# Patient Record
Sex: MALE | Race: WHITE | Employment: FULL TIME
[De-identification: names, ages, dates, MRNs, and addresses within clinical notes are randomized per-mention and may not be internally consistent; named-entity substitution may affect disease eponyms.]

---

## 2022-03-19 PROBLEM — N40.0 BENIGN PROSTATIC HYPERPLASIA WITHOUT LOWER URINARY TRACT SYMPTOMS: Status: ACTIVE | Noted: 2021-06-30

## 2022-03-19 PROBLEM — Z00.00 MEDICARE ANNUAL WELLNESS VISIT, SUBSEQUENT: Status: ACTIVE | Noted: 2021-08-11

## 2022-03-19 PROBLEM — Z71.89 ADVANCED CARE PLANNING/COUNSELING DISCUSSION: Status: ACTIVE | Noted: 2021-08-11

## 2022-06-16 ENCOUNTER — OFFICE VISIT (OUTPATIENT)
Dept: FAMILY MEDICINE CLINIC | Facility: CLINIC | Age: 52
End: 2022-06-16
Payer: MEDICARE

## 2022-06-16 VITALS
WEIGHT: 204 LBS | OXYGEN SATURATION: 95 % | BODY MASS INDEX: 31.02 KG/M2 | SYSTOLIC BLOOD PRESSURE: 102 MMHG | DIASTOLIC BLOOD PRESSURE: 70 MMHG | HEART RATE: 94 BPM

## 2022-06-16 DIAGNOSIS — I10 PRIMARY HYPERTENSION: Primary | ICD-10-CM

## 2022-06-16 DIAGNOSIS — I10 PRIMARY HYPERTENSION: ICD-10-CM

## 2022-06-16 DIAGNOSIS — Z12.12 SCREENING FOR MALIGNANT NEOPLASM OF THE RECTUM: ICD-10-CM

## 2022-06-16 DIAGNOSIS — Z12.11 SPECIAL SCREENING FOR MALIGNANT NEOPLASMS, COLON: ICD-10-CM

## 2022-06-16 DIAGNOSIS — N40.0 BENIGN PROSTATIC HYPERPLASIA WITHOUT LOWER URINARY TRACT SYMPTOMS: ICD-10-CM

## 2022-06-16 DIAGNOSIS — E78.00 HYPERCHOLESTEREMIA: ICD-10-CM

## 2022-06-16 PROBLEM — Z00.00 MEDICARE ANNUAL WELLNESS VISIT, SUBSEQUENT: Chronic | Status: ACTIVE | Noted: 2021-08-11

## 2022-06-16 PROBLEM — Z71.89 ADVANCED CARE PLANNING/COUNSELING DISCUSSION: Chronic | Status: ACTIVE | Noted: 2021-08-11

## 2022-06-16 LAB
ANION GAP SERPL CALC-SCNC: 7 MMOL/L (ref 7–16)
BUN SERPL-MCNC: 14 MG/DL (ref 6–23)
CALCIUM SERPL-MCNC: 9.1 MG/DL (ref 8.3–10.4)
CHLORIDE SERPL-SCNC: 100 MMOL/L (ref 98–107)
CO2 SERPL-SCNC: 32 MMOL/L (ref 21–32)
CREAT SERPL-MCNC: 1.2 MG/DL (ref 0.8–1.5)
GLUCOSE SERPL-MCNC: 93 MG/DL (ref 65–100)
POTASSIUM SERPL-SCNC: 3.6 MMOL/L (ref 3.5–5.1)
PSA SERPL-MCNC: 0.9 NG/ML
SODIUM SERPL-SCNC: 139 MMOL/L (ref 138–145)

## 2022-06-16 PROCEDURE — 99214 OFFICE O/P EST MOD 30 MIN: CPT | Performed by: FAMILY MEDICINE

## 2022-06-16 PROCEDURE — 3017F COLORECTAL CA SCREEN DOC REV: CPT | Performed by: FAMILY MEDICINE

## 2022-06-16 PROCEDURE — 4004F PT TOBACCO SCREEN RCVD TLK: CPT | Performed by: FAMILY MEDICINE

## 2022-06-16 PROCEDURE — G8417 CALC BMI ABV UP PARAM F/U: HCPCS | Performed by: FAMILY MEDICINE

## 2022-06-16 PROCEDURE — G8427 DOCREV CUR MEDS BY ELIG CLIN: HCPCS | Performed by: FAMILY MEDICINE

## 2022-06-16 RX ORDER — INDAPAMIDE 1.25 MG/1
1.25 TABLET, FILM COATED ORAL DAILY
Qty: 90 TABLET | Refills: 1 | Status: SHIPPED | OUTPATIENT
Start: 2022-06-16

## 2022-06-16 RX ORDER — ATORVASTATIN CALCIUM 80 MG/1
80 TABLET, FILM COATED ORAL DAILY
Qty: 90 TABLET | Refills: 1 | Status: SHIPPED | OUTPATIENT
Start: 2022-06-16

## 2022-06-16 RX ORDER — ASPIRIN 81 MG/1
81 TABLET ORAL DAILY
COMMUNITY

## 2022-06-16 RX ORDER — RAMIPRIL 10 MG/1
10 CAPSULE ORAL DAILY
Qty: 90 CAPSULE | Refills: 1 | Status: SHIPPED | OUTPATIENT
Start: 2022-06-16

## 2022-06-16 ASSESSMENT — PATIENT HEALTH QUESTIONNAIRE - PHQ9
SUM OF ALL RESPONSES TO PHQ9 QUESTIONS 1 & 2: 0
SUM OF ALL RESPONSES TO PHQ QUESTIONS 1-9: 0
2. FEELING DOWN, DEPRESSED OR HOPELESS: 0
SUM OF ALL RESPONSES TO PHQ QUESTIONS 1-9: 0
1. LITTLE INTEREST OR PLEASURE IN DOING THINGS: 0

## 2022-06-16 NOTE — PROGRESS NOTES
Subjective:  Celeste Sibley is a 46 y.o. male presents today for their semi-annual htn visit. They are having no side effects and are doing well. Systems review of cardiovascular and pulmonary systems reveal no complaints or pertinent positives. Patient denies any pounding heart beats or rapid heart beat intervals, flushing, panic like attacks, headaches, dizzyness or flushing. They have drug reistant hypertension, on 3 or more different medicaitons including one diuretic- No    How much are you exercising? Three days a week  Do you smoke? No  Are you taking your medicines as directed most every day? Yes  Do you follow a low sodium DASH diet or similar high blood pressure diet? No  Do you check your bp at home with an automated blood pressure device? Yes  If you don't have a home bp machine, you should purchase one at home and begin to check your pressure at home on a regular basis. Your blood pressure goal is listed under the \"plan section\" below    No Known Allergies   reports that he has never smoked. He uses smokeless tobacco.  Current Outpatient Medications   Medication Sig Dispense Refill    aspirin 81 MG EC tablet Take 81 mg by mouth daily      atorvastatin (LIPITOR) 80 MG tablet Take 1 tablet by mouth daily 90 tablet 1    indapamide (LOZOL) 1.25 MG tablet Take 1 tablet by mouth daily 90 tablet 1    ramipril (ALTACE) 10 MG capsule Take 1 capsule by mouth daily 90 capsule 1    baclofen (LIORESAL) 20 MG tablet Take 20 mg by mouth 3 times daily      DULoxetine (CYMBALTA) 60 MG extended release capsule Take 60 mg by mouth 2 times daily      Phenylephrine HCl 10 MG TABS Take 1 tablet by mouth      tapentadol (NUCYNTA) 50 MG TABS Take 50 mg by mouth 4 times daily. No current facility-administered medications for this visit.        Lab Results   Component Value Date     12/23/2021    K 3.5 12/23/2021    CL 99 12/23/2021    CO2 26 12/23/2021    BUN 14 12/23/2021    CREATININE 1.15 12/23/2021    GLUCOSE 96 12/23/2021    CALCIUM 9.3 12/23/2021          Objective:  Blood pressure 102/70, pulse 94, weight 204 lb (92.5 kg), SpO2 95 %. Body mass index is 31.02 kg/m². BP Readings from Last 3 Encounters:   06/16/22 102/70   12/23/21 120/84   08/11/21 118/68     General- Pleasant and no distress  Psych- alert and oriented to person, place and time  Mood and affect are appropriate to situation  Musculoskeletal - Gait and station examination reveals mid-position with no abnormalities. Neurological- grossly intact. rrr s mrg.   bcta  extremeties are without edema, and dp, and pt pulses are intact  No carotid bruits   Fundoscopic exam is: benign without retinopathology   Prostate of increased size and without asymmetry, nodules, or masses. He has good rectal tone with light brown stool. Some of today's visit is spent counseling with review of symptoms, disposition, prognosis and treatment plan options in addition to limited behavioral counseling and recommendations regarding an enlarged prostate and possible symptoms of low testosterone    AUA symptom index if administered is on flow sheet  Return in one year for annual prostate exam. We will call you back if labs are abnormal.      Assessment:  1. Primary hypertension    2. Benign prostatic hyperplasia without lower urinary tract symptoms    3. Hypercholesteremia    4. Special screening for malignant neoplasms, colon    5. Screening for malignant neoplasm of the rectum      Plan:   Prescription drug management occurs today as follows:  Because current regimen for blood pressure is now working and tolerated, will continue medications as listed    Francisco has been given the following recommendations today due to his elevated BP reading: lifestyle modifications to include: DASH eating plan and lab tests ordered. Personal instruction is given.   For your information, consider the following:  [facility] is an excellent site that will give you a list of approved blood pressure devices  [employer] is an excellent site that will teach you how to take accurate bp measurements at home. Significant weight loss can result in a drop of 5-10mm blood pressure. A DASH diet (see bookstore or go to www."[employer] and print DASH diet) will lower 8-14mm blood pressure. Salt restriction will lower your bp 2-8mm. Lowering alcohol consumption to moderate use will lower your pressure 2-4mm. Continue efforts to maintain an exercise regimen. Normal blood pressure target is now 120/80. Stage 1 or \"pre-hypertension\" or \"high normal hypertension\" is 130-139/80-89. We sometimes begin treatment with medications. Stage 2 is >140/90 which is when we always begin treatment with medications. For high risk patients such as those with heart disease, a history of stents, angioplasty, heart attacks, strokes, diabetes and chronic kidney disease,your blood pressure goal is 130/80. For lower risk patients without the high risk categories, your goal for blood pressure is 139/89. Unless you are older than 72years old we may try for a systolic bp of 731 or we will accept higher pressures if the lower pressures are not tolerated. 1. Primary hypertension  -     indapamide (LOZOL) 1.25 MG tablet; Take 1 tablet by mouth daily, Disp-90 tablet, R-1Normal  -     ramipril (ALTACE) 10 MG capsule; Take 1 capsule by mouth daily, Disp-90 capsule, R-1Normal  -     Basic Metabolic Panel; Future  2. Benign prostatic hyperplasia without lower urinary tract symptoms  -     PSA, Diagnostic; Future  3. Hypercholesteremia  -     atorvastatin (LIPITOR) 80 MG tablet; Take 1 tablet by mouth daily, Disp-90 tablet, R-1Normal  4. Special screening for malignant neoplasms, colon  -     External Referral To Gastroenterology  5. Screening for malignant neoplasm of the rectum  -     External Referral To Gastroenterology      Followup:  Return for 6 mo for htn, lipids. Late August for mwv.

## 2022-07-16 PROBLEM — Z00.00 MEDICARE ANNUAL WELLNESS VISIT, SUBSEQUENT: Chronic | Status: RESOLVED | Noted: 2021-08-11 | Resolved: 2022-07-16

## 2022-12-14 ENCOUNTER — OFFICE VISIT (OUTPATIENT)
Dept: FAMILY MEDICINE CLINIC | Facility: CLINIC | Age: 52
End: 2022-12-14
Payer: MEDICARE

## 2022-12-14 VITALS
BODY MASS INDEX: 31.95 KG/M2 | WEIGHT: 210.8 LBS | HEART RATE: 81 BPM | DIASTOLIC BLOOD PRESSURE: 78 MMHG | SYSTOLIC BLOOD PRESSURE: 128 MMHG | OXYGEN SATURATION: 98 % | HEIGHT: 68 IN

## 2022-12-14 DIAGNOSIS — I10 PRIMARY HYPERTENSION: Primary | ICD-10-CM

## 2022-12-14 DIAGNOSIS — I63.9 ACUTE ISCHEMIC STROKE (HCC): ICD-10-CM

## 2022-12-14 DIAGNOSIS — E78.00 HYPERCHOLESTEREMIA: ICD-10-CM

## 2022-12-14 PROCEDURE — 3078F DIAST BP <80 MM HG: CPT | Performed by: FAMILY MEDICINE

## 2022-12-14 PROCEDURE — G8427 DOCREV CUR MEDS BY ELIG CLIN: HCPCS | Performed by: FAMILY MEDICINE

## 2022-12-14 PROCEDURE — 3074F SYST BP LT 130 MM HG: CPT | Performed by: FAMILY MEDICINE

## 2022-12-14 PROCEDURE — G8417 CALC BMI ABV UP PARAM F/U: HCPCS | Performed by: FAMILY MEDICINE

## 2022-12-14 PROCEDURE — 4004F PT TOBACCO SCREEN RCVD TLK: CPT | Performed by: FAMILY MEDICINE

## 2022-12-14 PROCEDURE — 99214 OFFICE O/P EST MOD 30 MIN: CPT | Performed by: FAMILY MEDICINE

## 2022-12-14 PROCEDURE — G8484 FLU IMMUNIZE NO ADMIN: HCPCS | Performed by: FAMILY MEDICINE

## 2022-12-14 PROCEDURE — 3017F COLORECTAL CA SCREEN DOC REV: CPT | Performed by: FAMILY MEDICINE

## 2022-12-14 RX ORDER — INDAPAMIDE 1.25 MG/1
1.25 TABLET, FILM COATED ORAL DAILY
Qty: 90 TABLET | Refills: 1 | Status: SHIPPED | OUTPATIENT
Start: 2022-12-14

## 2022-12-14 RX ORDER — RAMIPRIL 10 MG/1
10 CAPSULE ORAL DAILY
Qty: 90 CAPSULE | Refills: 1 | Status: SHIPPED | OUTPATIENT
Start: 2022-12-14

## 2022-12-14 RX ORDER — ATORVASTATIN CALCIUM 80 MG/1
80 TABLET, FILM COATED ORAL DAILY
Qty: 90 TABLET | Refills: 1 | Status: SHIPPED | OUTPATIENT
Start: 2022-12-14

## 2022-12-14 ASSESSMENT — PATIENT HEALTH QUESTIONNAIRE - PHQ9
SUM OF ALL RESPONSES TO PHQ QUESTIONS 1-9: 0
SUM OF ALL RESPONSES TO PHQ QUESTIONS 1-9: 0
SUM OF ALL RESPONSES TO PHQ9 QUESTIONS 1 & 2: 0
2. FEELING DOWN, DEPRESSED OR HOPELESS: 0
SUM OF ALL RESPONSES TO PHQ QUESTIONS 1-9: 0
SUM OF ALL RESPONSES TO PHQ QUESTIONS 1-9: 0
1. LITTLE INTEREST OR PLEASURE IN DOING THINGS: 0

## 2022-12-14 NOTE — PROGRESS NOTES
Subjective:  Alirio Toro is a 45 y.o. male presents today for their semi-annual htn visit. They are having no side effects and are doing well. Systems review of cardiovascular and pulmonary systems reveal no complaints or pertinent postivies. They also have a diagnosis of dyslipidemia and are due for lipids, denying any current side effects, continuing diet and exercise the best they can. Patient denies any pounding heart beats or rapid heart beat intervals, flushing, panic like attacks, headaches, dizzyness or flushing. They have drug reistant hypertension, on 3 or more different medicaitons including one diuretic- No    How much are you exercising? 3 days a week. Do you smoke? No  Are you taking your medicines as directed most every day? Yes  Do you follow a low sodium DASH diet or similar high blood pressure diet? No  Do you check your bp at home with an automated blood pressure device? Yes  If you don't have a home bp machine, you should purchase one at home and begin to check your pressure at home on a regular basis. Your blood pressure goal is listed under the \"plan section\" below  PHQ-9 Total Score: 0 (12/14/2022  9:35 AM)    No Known Allergies   reports that he has never smoked. He uses smokeless tobacco.    Current Outpatient Medications   Medication Sig Dispense Refill    atorvastatin (LIPITOR) 80 MG tablet Take 1 tablet by mouth daily 90 tablet 1    indapamide (LOZOL) 1.25 MG tablet Take 1 tablet by mouth daily 90 tablet 1    ramipril (ALTACE) 10 MG capsule Take 1 capsule by mouth daily 90 capsule 1    aspirin 81 MG EC tablet Take 81 mg by mouth daily      baclofen (LIORESAL) 20 MG tablet Take 20 mg by mouth 3 times daily      DULoxetine (CYMBALTA) 60 MG extended release capsule Take 60 mg by mouth 2 times daily      Phenylephrine HCl 10 MG TABS Take 1 tablet by mouth      tapentadol (NUCYNTA) 50 MG TABS Take 50 mg by mouth 4 times daily.        No current facility-administered medications for this visit. Lab Results   Component Value Date/Time     06/16/2022 11:44 AM    K 3.6 06/16/2022 11:44 AM     06/16/2022 11:44 AM    CO2 32 06/16/2022 11:44 AM    BUN 14 06/16/2022 11:44 AM    CREATININE 1.20 06/16/2022 11:44 AM    GLUCOSE 93 06/16/2022 11:44 AM    CALCIUM 9.1 06/16/2022 11:44 AM        Objective:  Blood pressure 128/78, pulse 81, height 5' 8\" (1.727 m), weight 210 lb 12.8 oz (95.6 kg), SpO2 98 %. Body mass index is 32.05 kg/m². BP Readings from Last 3 Encounters:   12/14/22 128/78   06/16/22 102/70   12/23/21 120/84     General- Pleasant and no distress  Psych- alert and oriented to person, place and time  Mood and affect are appropriate to situation  Musculoskeletal - Gait and station examination reveals mid-position with no abnormalities. Neurological- grossly intact. rrr s mrg.   bcta  extremeties are without edema, and dp, and pt pulses are intact  No carotid bruits   Fundoscopic exam is: benign without retinopathology     Assessment:  1. Primary hypertension    2. Hypercholesteremia    3. Acute ischemic stroke [facility])        Plan:   Prescription drug management occurs today as follows:  Because current regimen for blood pressure is now working and tolerated, will continue medications as listed    Francisco has been given the following recommendations today due to his elevated BP reading: lab tests ordered. Personal instruction is given. For your information, consider the following:  [employer] is an excellent site that will give you a list of approved blood pressure devices  Jennifer Mccarty is an excellent site that will teach you how to take accurate bp measurements at home. Significant weight loss can result in a drop of 5-10mm blood pressure. A DASH diet (see bookstore or go to www.[email or web address] and print DASH diet) will lower 8-14mm blood pressure. Salt restriction will lower your bp 2-8mm.   Lowering alcohol consumption to moderate use will lower your pressure 2-4mm. Continue efforts to maintain an exercise regimen. Normal blood pressure target is now 120/80. Stage 1 or \"pre-hypertension\" or \"high normal hypertension\" is 130-139/80-89. We sometimes begin treatment with medications. Stage 2 is >140/90 which is when we always begin treatment with medications. For high risk patients such as those with heart disease, a history of stents, angioplasty, heart attacks, strokes, diabetes and chronic kidney disease,your blood pressure goal is 130/80. For lower risk patients without the high risk categories, your goal for blood pressure is 139/89. Unless you are older than 72years old we may try for a systolic bp of 238 or we will accept higher pressures if the lower pressures are not tolerated. 1. Primary hypertension  -     Basic Metabolic Panel; Future  -     indapamide (LOZOL) 1.25 MG tablet; Take 1 tablet by mouth daily, Disp-90 tablet, R-1Normal  -     ramipril (ALTACE) 10 MG capsule; Take 1 capsule by mouth daily, Disp-90 capsule, R-1Normal  2. Hypercholesteremia  -     Lipid Panel; Future  -     atorvastatin (LIPITOR) 80 MG tablet; Take 1 tablet by mouth daily, Disp-90 tablet, R-1Normal  3. Acute ischemic stroke [facility])    Followup:  Return for 6 mo for htn and prostate visit,  combine with david.

## 2022-12-15 LAB
ANION GAP SERPL CALC-SCNC: 6 MMOL/L (ref 2–11)
BUN SERPL-MCNC: 12 MG/DL (ref 6–23)
CALCIUM SERPL-MCNC: 9.4 MG/DL (ref 8.3–10.4)
CHLORIDE SERPL-SCNC: 102 MMOL/L (ref 101–110)
CHOLEST SERPL-MCNC: 114 MG/DL
CO2 SERPL-SCNC: 32 MMOL/L (ref 21–32)
CREAT SERPL-MCNC: 1.2 MG/DL (ref 0.8–1.5)
GLUCOSE SERPL-MCNC: 96 MG/DL (ref 65–100)
HDLC SERPL-MCNC: 38 MG/DL (ref 40–60)
HDLC SERPL: 3 {RATIO}
LDLC SERPL CALC-MCNC: 49.2 MG/DL
POTASSIUM SERPL-SCNC: 3.3 MMOL/L (ref 3.5–5.1)
SODIUM SERPL-SCNC: 140 MMOL/L (ref 133–143)
TRIGL SERPL-MCNC: 134 MG/DL (ref 35–150)
VLDLC SERPL CALC-MCNC: 26.8 MG/DL (ref 6–23)

## 2022-12-16 RX ORDER — POTASSIUM CHLORIDE 750 MG/1
10 TABLET, EXTENDED RELEASE ORAL 2 TIMES DAILY
Qty: 180 TABLET | Refills: 1 | OUTPATIENT
Start: 2022-12-16

## 2023-01-13 PROBLEM — Z00.00 MEDICARE ANNUAL WELLNESS VISIT, SUBSEQUENT: Chronic | Status: RESOLVED | Noted: 2021-08-11 | Resolved: 2023-01-13

## 2023-06-26 ENCOUNTER — OFFICE VISIT (OUTPATIENT)
Dept: FAMILY MEDICINE CLINIC | Facility: CLINIC | Age: 53
End: 2023-06-26
Payer: MEDICARE

## 2023-06-26 VITALS
SYSTOLIC BLOOD PRESSURE: 109 MMHG | BODY MASS INDEX: 31.52 KG/M2 | WEIGHT: 208 LBS | DIASTOLIC BLOOD PRESSURE: 74 MMHG | HEIGHT: 68 IN | HEART RATE: 80 BPM | OXYGEN SATURATION: 97 %

## 2023-06-26 DIAGNOSIS — E78.00 HYPERCHOLESTEREMIA: ICD-10-CM

## 2023-06-26 DIAGNOSIS — N40.0 BENIGN PROSTATIC HYPERPLASIA WITHOUT LOWER URINARY TRACT SYMPTOMS: ICD-10-CM

## 2023-06-26 DIAGNOSIS — I10 PRIMARY HYPERTENSION: Primary | ICD-10-CM

## 2023-06-26 DIAGNOSIS — Z00.00 MEDICARE ANNUAL WELLNESS VISIT, SUBSEQUENT: Chronic | ICD-10-CM

## 2023-06-26 DIAGNOSIS — D12.6 TUBULAR ADENOMA OF COLON: ICD-10-CM

## 2023-06-26 PROCEDURE — 3078F DIAST BP <80 MM HG: CPT | Performed by: FAMILY MEDICINE

## 2023-06-26 PROCEDURE — 4004F PT TOBACCO SCREEN RCVD TLK: CPT | Performed by: FAMILY MEDICINE

## 2023-06-26 PROCEDURE — G0439 PPPS, SUBSEQ VISIT: HCPCS | Performed by: FAMILY MEDICINE

## 2023-06-26 PROCEDURE — 3017F COLORECTAL CA SCREEN DOC REV: CPT | Performed by: FAMILY MEDICINE

## 2023-06-26 PROCEDURE — G8427 DOCREV CUR MEDS BY ELIG CLIN: HCPCS | Performed by: FAMILY MEDICINE

## 2023-06-26 PROCEDURE — 3074F SYST BP LT 130 MM HG: CPT | Performed by: FAMILY MEDICINE

## 2023-06-26 PROCEDURE — G8417 CALC BMI ABV UP PARAM F/U: HCPCS | Performed by: FAMILY MEDICINE

## 2023-06-26 PROCEDURE — 99214 OFFICE O/P EST MOD 30 MIN: CPT | Performed by: FAMILY MEDICINE

## 2023-06-26 RX ORDER — POTASSIUM CHLORIDE 750 MG/1
10 TABLET, EXTENDED RELEASE ORAL 2 TIMES DAILY
Qty: 180 TABLET | Refills: 1 | Status: SHIPPED | OUTPATIENT
Start: 2023-06-26

## 2023-06-26 RX ORDER — RAMIPRIL 10 MG/1
10 CAPSULE ORAL DAILY
Qty: 90 CAPSULE | Refills: 1 | Status: SHIPPED | OUTPATIENT
Start: 2023-06-26

## 2023-06-26 RX ORDER — INDAPAMIDE 1.25 MG/1
1.25 TABLET, FILM COATED ORAL DAILY
Qty: 90 TABLET | Refills: 1 | Status: SHIPPED | OUTPATIENT
Start: 2023-06-26

## 2023-06-26 RX ORDER — ATORVASTATIN CALCIUM 80 MG/1
80 TABLET, FILM COATED ORAL DAILY
Qty: 90 TABLET | Refills: 1 | Status: SHIPPED | OUTPATIENT
Start: 2023-06-26

## 2023-06-26 SDOH — ECONOMIC STABILITY: INCOME INSECURITY: HOW HARD IS IT FOR YOU TO PAY FOR THE VERY BASICS LIKE FOOD, HOUSING, MEDICAL CARE, AND HEATING?: NOT HARD AT ALL

## 2023-06-26 SDOH — ECONOMIC STABILITY: FOOD INSECURITY: WITHIN THE PAST 12 MONTHS, YOU WORRIED THAT YOUR FOOD WOULD RUN OUT BEFORE YOU GOT MONEY TO BUY MORE.: NEVER TRUE

## 2023-06-26 SDOH — ECONOMIC STABILITY: FOOD INSECURITY: WITHIN THE PAST 12 MONTHS, THE FOOD YOU BOUGHT JUST DIDN'T LAST AND YOU DIDN'T HAVE MONEY TO GET MORE.: NEVER TRUE

## 2023-06-26 SDOH — ECONOMIC STABILITY: HOUSING INSECURITY
IN THE LAST 12 MONTHS, WAS THERE A TIME WHEN YOU DID NOT HAVE A STEADY PLACE TO SLEEP OR SLEPT IN A SHELTER (INCLUDING NOW)?: NO

## 2023-06-26 ASSESSMENT — LIFESTYLE VARIABLES
HOW MANY STANDARD DRINKS CONTAINING ALCOHOL DO YOU HAVE ON A TYPICAL DAY: PATIENT DOES NOT DRINK
HOW OFTEN DO YOU HAVE A DRINK CONTAINING ALCOHOL: NEVER

## 2023-06-26 ASSESSMENT — PATIENT HEALTH QUESTIONNAIRE - PHQ9
2. FEELING DOWN, DEPRESSED OR HOPELESS: 0
1. LITTLE INTEREST OR PLEASURE IN DOING THINGS: 0
SUM OF ALL RESPONSES TO PHQ QUESTIONS 1-9: 0
SUM OF ALL RESPONSES TO PHQ9 QUESTIONS 1 & 2: 0
SUM OF ALL RESPONSES TO PHQ QUESTIONS 1-9: 0

## 2023-06-27 LAB
ANION GAP SERPL CALC-SCNC: 1 MMOL/L (ref 2–11)
BUN SERPL-MCNC: 14 MG/DL (ref 6–23)
CALCIUM SERPL-MCNC: 9.5 MG/DL (ref 8.3–10.4)
CHLORIDE SERPL-SCNC: 104 MMOL/L (ref 101–110)
CO2 SERPL-SCNC: 32 MMOL/L (ref 21–32)
CREAT SERPL-MCNC: 1.2 MG/DL (ref 0.8–1.5)
GLUCOSE SERPL-MCNC: 98 MG/DL (ref 65–100)
POTASSIUM SERPL-SCNC: 4 MMOL/L (ref 3.5–5.1)
PSA SERPL-MCNC: 0.9 NG/ML
SODIUM SERPL-SCNC: 137 MMOL/L (ref 133–143)

## 2023-09-12 ENCOUNTER — OFFICE VISIT (OUTPATIENT)
Dept: FAMILY MEDICINE CLINIC | Facility: CLINIC | Age: 53
End: 2023-09-12
Payer: MEDICARE

## 2023-09-12 VITALS
SYSTOLIC BLOOD PRESSURE: 110 MMHG | OXYGEN SATURATION: 96 % | RESPIRATION RATE: 18 BRPM | TEMPERATURE: 97 F | HEART RATE: 62 BPM | DIASTOLIC BLOOD PRESSURE: 72 MMHG | BODY MASS INDEX: 31.31 KG/M2 | WEIGHT: 206.6 LBS | HEIGHT: 68 IN

## 2023-09-12 DIAGNOSIS — H66.001 NON-RECURRENT ACUTE SUPPURATIVE OTITIS MEDIA OF RIGHT EAR WITHOUT SPONTANEOUS RUPTURE OF TYMPANIC MEMBRANE: Primary | ICD-10-CM

## 2023-09-12 DIAGNOSIS — B34.9 VIRAL SYNDROME: ICD-10-CM

## 2023-09-12 DIAGNOSIS — M79.10 MYALGIA: ICD-10-CM

## 2023-09-12 DIAGNOSIS — R05.1 ACUTE COUGH: ICD-10-CM

## 2023-09-12 PROCEDURE — 3017F COLORECTAL CA SCREEN DOC REV: CPT | Performed by: FAMILY MEDICINE

## 2023-09-12 PROCEDURE — 3074F SYST BP LT 130 MM HG: CPT | Performed by: FAMILY MEDICINE

## 2023-09-12 PROCEDURE — 3078F DIAST BP <80 MM HG: CPT | Performed by: FAMILY MEDICINE

## 2023-09-12 PROCEDURE — 4004F PT TOBACCO SCREEN RCVD TLK: CPT | Performed by: FAMILY MEDICINE

## 2023-09-12 PROCEDURE — G8427 DOCREV CUR MEDS BY ELIG CLIN: HCPCS | Performed by: FAMILY MEDICINE

## 2023-09-12 PROCEDURE — G8417 CALC BMI ABV UP PARAM F/U: HCPCS | Performed by: FAMILY MEDICINE

## 2023-09-12 PROCEDURE — 99213 OFFICE O/P EST LOW 20 MIN: CPT | Performed by: FAMILY MEDICINE

## 2023-09-12 RX ORDER — BENZONATATE 200 MG/1
200 CAPSULE ORAL 3 TIMES DAILY PRN
Qty: 30 CAPSULE | Refills: 1 | Status: SHIPPED | OUTPATIENT
Start: 2023-09-12 | End: 2023-09-19

## 2023-09-12 RX ORDER — AMOXICILLIN 875 MG/1
875 TABLET, COATED ORAL 2 TIMES DAILY
Qty: 14 TABLET | Refills: 0 | Status: SHIPPED | OUTPATIENT
Start: 2023-09-12 | End: 2023-09-19

## 2023-09-12 ASSESSMENT — PATIENT HEALTH QUESTIONNAIRE - PHQ9
SUM OF ALL RESPONSES TO PHQ QUESTIONS 1-9: 0
SUM OF ALL RESPONSES TO PHQ9 QUESTIONS 1 & 2: 0
2. FEELING DOWN, DEPRESSED OR HOPELESS: 0
1. LITTLE INTEREST OR PLEASURE IN DOING THINGS: 0
SUM OF ALL RESPONSES TO PHQ QUESTIONS 1-9: 0

## 2024-04-03 ENCOUNTER — TELEPHONE (OUTPATIENT)
Dept: FAMILY MEDICINE CLINIC | Facility: CLINIC | Age: 54
End: 2024-04-03

## 2024-04-03 NOTE — TELEPHONE ENCOUNTER
----- Message from Taran Coronado sent at 4/3/2024  3:16 PM EDT -----  Subject: Message to Provider    QUESTIONS  Information for Provider? Patient is wanting to know if the PCP will fill   out a Foster Care Physical paperwork for him. He is not due to come in   until June but needs the paper ASAP. 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   ---------------------------------------------------------------------------  --------------  CALL BACK INFO  [phone]; OK to leave message on voicemail  ---------------------------------------------------------------------------  --------------  SCRIPT ANSWERS  Relationship to Patient? Self

## 2024-04-04 NOTE — TELEPHONE ENCOUNTER
I called Mr. Singh and left a voicemail stating he can drop off the paperwork today at the office and the nurse will fill it out. We will call him if we need any information from him. I let him know we will work on it as soon as we can as time allows us.

## 2024-06-17 ENCOUNTER — OFFICE VISIT (OUTPATIENT)
Dept: FAMILY MEDICINE CLINIC | Facility: CLINIC | Age: 54
End: 2024-06-17
Payer: MEDICARE

## 2024-06-17 VITALS
HEART RATE: 79 BPM | TEMPERATURE: 97 F | DIASTOLIC BLOOD PRESSURE: 83 MMHG | HEIGHT: 68 IN | SYSTOLIC BLOOD PRESSURE: 128 MMHG | BODY MASS INDEX: 33.19 KG/M2 | WEIGHT: 219 LBS | RESPIRATION RATE: 16 BRPM | OXYGEN SATURATION: 97 %

## 2024-06-17 DIAGNOSIS — E78.00 HYPERCHOLESTEREMIA: ICD-10-CM

## 2024-06-17 DIAGNOSIS — I10 PRIMARY HYPERTENSION: ICD-10-CM

## 2024-06-17 DIAGNOSIS — N40.0 BENIGN PROSTATIC HYPERPLASIA WITHOUT LOWER URINARY TRACT SYMPTOMS: Primary | ICD-10-CM

## 2024-06-17 LAB
ANION GAP SERPL CALC-SCNC: 10 MMOL/L (ref 9–18)
BUN SERPL-MCNC: 17 MG/DL (ref 6–23)
CALCIUM SERPL-MCNC: 9.5 MG/DL (ref 8.8–10.2)
CHLORIDE SERPL-SCNC: 98 MMOL/L (ref 98–107)
CO2 SERPL-SCNC: 30 MMOL/L (ref 20–28)
CREAT SERPL-MCNC: 1.04 MG/DL (ref 0.8–1.3)
GLUCOSE SERPL-MCNC: 98 MG/DL (ref 70–99)
POTASSIUM SERPL-SCNC: 3.9 MMOL/L (ref 3.5–5.1)
PSA SERPL-MCNC: 0.7 NG/ML (ref 0–4)
SODIUM SERPL-SCNC: 138 MMOL/L (ref 136–145)

## 2024-06-17 PROCEDURE — 99214 OFFICE O/P EST MOD 30 MIN: CPT | Performed by: FAMILY MEDICINE

## 2024-06-17 PROCEDURE — 3079F DIAST BP 80-89 MM HG: CPT | Performed by: FAMILY MEDICINE

## 2024-06-17 PROCEDURE — 4004F PT TOBACCO SCREEN RCVD TLK: CPT | Performed by: FAMILY MEDICINE

## 2024-06-17 PROCEDURE — G8417 CALC BMI ABV UP PARAM F/U: HCPCS | Performed by: FAMILY MEDICINE

## 2024-06-17 PROCEDURE — G8427 DOCREV CUR MEDS BY ELIG CLIN: HCPCS | Performed by: FAMILY MEDICINE

## 2024-06-17 PROCEDURE — 3017F COLORECTAL CA SCREEN DOC REV: CPT | Performed by: FAMILY MEDICINE

## 2024-06-17 PROCEDURE — 3074F SYST BP LT 130 MM HG: CPT | Performed by: FAMILY MEDICINE

## 2024-06-17 RX ORDER — POTASSIUM CHLORIDE 750 MG/1
10 TABLET, EXTENDED RELEASE ORAL 2 TIMES DAILY
Qty: 180 TABLET | Refills: 1 | Status: SHIPPED | OUTPATIENT
Start: 2024-06-17

## 2024-06-17 RX ORDER — RAMIPRIL 10 MG/1
10 CAPSULE ORAL DAILY
Qty: 90 CAPSULE | Refills: 1 | Status: SHIPPED | OUTPATIENT
Start: 2024-06-17

## 2024-06-17 RX ORDER — INDAPAMIDE 1.25 MG/1
1.25 TABLET ORAL DAILY
Qty: 90 TABLET | Refills: 1 | Status: SHIPPED | OUTPATIENT
Start: 2024-06-17

## 2024-06-17 RX ORDER — ATORVASTATIN CALCIUM 80 MG/1
80 TABLET, FILM COATED ORAL DAILY
Qty: 90 TABLET | Refills: 1 | Status: SHIPPED | OUTPATIENT
Start: 2024-06-17

## 2024-06-17 ASSESSMENT — PATIENT HEALTH QUESTIONNAIRE - PHQ9
SUM OF ALL RESPONSES TO PHQ QUESTIONS 1-9: 0
SUM OF ALL RESPONSES TO PHQ9 QUESTIONS 1 & 2: 0
SUM OF ALL RESPONSES TO PHQ QUESTIONS 1-9: 0
SUM OF ALL RESPONSES TO PHQ QUESTIONS 1-9: 0
1. LITTLE INTEREST OR PLEASURE IN DOING THINGS: NOT AT ALL
SUM OF ALL RESPONSES TO PHQ QUESTIONS 1-9: 0
2. FEELING DOWN, DEPRESSED OR HOPELESS: NOT AT ALL

## 2024-06-17 NOTE — PROGRESS NOTES
Subjective:  Reyes Singh Jr. is a 53 y.o. male presents today for their semi-annual htn, dyslipidemia and prostate visit. They are having no side effects and are doing well.  Systems review of cardiovascular and pulmonary systems reveal no complaints or pertinent positives.  Patient denies any pounding heart beats or rapid heart beat intervals, flushing, panic like attacks, headaches, dizzyness or flushing.  They have drug reistant hypertension, on 3 or more different medicaitons including one diuretic- No    How much are you exercising? Yes 3 d p w  Do you smoke? No  Are you taking your medicines as directed most every day? Yes  Do you follow a low sodium DASH diet or similar high blood pressure diet? No  Do you check your bp at home with an automated blood pressure device? Yes  If you don't have a home bp machine, you should purchase one at home and begin to check your pressure at home on a regular basis.  Your blood pressure goal is listed under the \"plan section\" below    No Known Allergies   reports that he has never smoked. He has been exposed to tobacco smoke. His smokeless tobacco use includes snuff.  Current Outpatient Medications   Medication Sig Dispense Refill    atorvastatin (LIPITOR) 80 MG tablet Take 1 tablet by mouth daily 90 tablet 1    indapamide (LOZOL) 1.25 MG tablet Take 1 tablet by mouth daily 90 tablet 1    potassium chloride (KLOR-CON M) 10 MEQ extended release tablet Take 1 tablet by mouth 2 times daily 180 tablet 1    ramipril (ALTACE) 10 MG capsule Take 1 capsule by mouth daily 90 capsule 1    Pseudoephedrine HCl (SUDAFED 12 HOUR PO) Take by mouth OTC as directed      aspirin 81 MG EC tablet Take 1 tablet by mouth daily      baclofen (LIORESAL) 20 MG tablet Take 1 tablet by mouth 3 times daily      DULoxetine (CYMBALTA) 60 MG extended release capsule Take 1 capsule by mouth 2 times daily      Phenylephrine HCl 10 MG TABS Take 1 tablet by mouth      tapentadol (NUCYNTA) 50 MG 
Dr. Shetty

## 2024-12-17 ENCOUNTER — OFFICE VISIT (OUTPATIENT)
Dept: FAMILY MEDICINE CLINIC | Facility: CLINIC | Age: 54
End: 2024-12-17

## 2024-12-17 VITALS
BODY MASS INDEX: 33.86 KG/M2 | RESPIRATION RATE: 16 BRPM | HEART RATE: 75 BPM | WEIGHT: 223.4 LBS | SYSTOLIC BLOOD PRESSURE: 115 MMHG | HEIGHT: 68 IN | TEMPERATURE: 98 F | DIASTOLIC BLOOD PRESSURE: 78 MMHG | OXYGEN SATURATION: 91 %

## 2024-12-17 DIAGNOSIS — E78.00 HYPERCHOLESTEREMIA: ICD-10-CM

## 2024-12-17 DIAGNOSIS — Z00.00 MEDICARE ANNUAL WELLNESS VISIT, SUBSEQUENT: Chronic | ICD-10-CM

## 2024-12-17 DIAGNOSIS — I10 PRIMARY HYPERTENSION: Primary | ICD-10-CM

## 2024-12-17 DIAGNOSIS — Z23 INFLUENZA VACCINE NEEDED: ICD-10-CM

## 2024-12-17 DIAGNOSIS — Z86.73 HISTORY OF ISCHEMIC STROKE: ICD-10-CM

## 2024-12-17 LAB
ANION GAP SERPL CALC-SCNC: 12 MMOL/L (ref 7–16)
BUN SERPL-MCNC: 17 MG/DL (ref 6–23)
CALCIUM SERPL-MCNC: 9.6 MG/DL (ref 8.8–10.2)
CHLORIDE SERPL-SCNC: 100 MMOL/L (ref 98–107)
CHOLEST SERPL-MCNC: 121 MG/DL (ref 0–200)
CO2 SERPL-SCNC: 27 MMOL/L (ref 20–29)
CREAT SERPL-MCNC: 0.98 MG/DL (ref 0.8–1.3)
GLUCOSE SERPL-MCNC: 95 MG/DL (ref 70–99)
HDLC SERPL-MCNC: 33 MG/DL (ref 40–60)
HDLC SERPL: 3.7 (ref 0–5)
LDLC SERPL CALC-MCNC: 58 MG/DL (ref 0–100)
POTASSIUM SERPL-SCNC: 3.5 MMOL/L (ref 3.5–5.1)
SODIUM SERPL-SCNC: 139 MMOL/L (ref 136–145)
TRIGL SERPL-MCNC: 152 MG/DL (ref 0–150)
VLDLC SERPL CALC-MCNC: 30 MG/DL (ref 6–23)

## 2024-12-17 RX ORDER — ATORVASTATIN CALCIUM 80 MG/1
80 TABLET, FILM COATED ORAL DAILY
Qty: 90 TABLET | Refills: 1 | Status: SHIPPED | OUTPATIENT
Start: 2024-12-17

## 2024-12-17 RX ORDER — RAMIPRIL 10 MG/1
10 CAPSULE ORAL DAILY
Qty: 90 CAPSULE | Refills: 1 | Status: SHIPPED | OUTPATIENT
Start: 2024-12-17

## 2024-12-17 RX ORDER — INDAPAMIDE 1.25 MG/1
1.25 TABLET ORAL DAILY
Qty: 90 TABLET | Refills: 1 | Status: SHIPPED | OUTPATIENT
Start: 2024-12-17

## 2024-12-17 RX ORDER — POTASSIUM CHLORIDE 750 MG/1
10 TABLET, EXTENDED RELEASE ORAL 2 TIMES DAILY
Qty: 180 TABLET | Refills: 1 | Status: SHIPPED | OUTPATIENT
Start: 2024-12-17

## 2024-12-17 SDOH — ECONOMIC STABILITY: FOOD INSECURITY: WITHIN THE PAST 12 MONTHS, YOU WORRIED THAT YOUR FOOD WOULD RUN OUT BEFORE YOU GOT MONEY TO BUY MORE.: NEVER TRUE

## 2024-12-17 SDOH — ECONOMIC STABILITY: FOOD INSECURITY: WITHIN THE PAST 12 MONTHS, THE FOOD YOU BOUGHT JUST DIDN'T LAST AND YOU DIDN'T HAVE MONEY TO GET MORE.: NEVER TRUE

## 2024-12-17 SDOH — ECONOMIC STABILITY: INCOME INSECURITY: HOW HARD IS IT FOR YOU TO PAY FOR THE VERY BASICS LIKE FOOD, HOUSING, MEDICAL CARE, AND HEATING?: NOT HARD AT ALL

## 2024-12-17 ASSESSMENT — PATIENT HEALTH QUESTIONNAIRE - PHQ9
SUM OF ALL RESPONSES TO PHQ QUESTIONS 1-9: 0
2. FEELING DOWN, DEPRESSED OR HOPELESS: NOT AT ALL
SUM OF ALL RESPONSES TO PHQ QUESTIONS 1-9: 0
SUM OF ALL RESPONSES TO PHQ9 QUESTIONS 1 & 2: 0

## 2024-12-17 NOTE — PROGRESS NOTES
Subjective:  Reyes Singh Jr. is a 54 y.o. male presents today for their semi-annual htn visit. They are having no side effects and are doing well.    Systems review of cardiovascular and pulmonary systems reveal no complaints or pertinent postivies.  They also have a diagnosis of dyslipidemia and are due for lipids and prostate check , denying any current side effects, continuing diet and exercise the best they can.  Patient denies any pounding heart beats or rapid heart beat intervals, flushing, panic like attacks, headaches, dizzyness or flushing.  They have drug reistant hypertension, on 3 or more different medicaitons including one diuretic- No  PHQ-9 Total Score: 0 (12/17/2024  9:39 AM)  Also due for mwv    How much are you exercising? Yes 3 d p w  Do you smoke? No  Are you taking your medicines as directed most every day? Yes  Do you follow a low sodium DASH diet or similar high blood pressure diet? No  Do you check your bp at home with an automated blood pressure device? Yes  If you don't have a home bp machine, you should purchase one at home and begin to check your pressure at home on a regular basis.  Your blood pressure goal is listed under the \"plan section\" below    No Known Allergies   reports that he has never smoked. He has been exposed to tobacco smoke. His smokeless tobacco use includes snuff.    Current Outpatient Medications   Medication Sig Dispense Refill    atorvastatin (LIPITOR) 80 MG tablet Take 1 tablet by mouth daily 90 tablet 1    indapamide (LOZOL) 1.25 MG tablet Take 1 tablet by mouth daily 90 tablet 1    potassium chloride (KLOR-CON M) 10 MEQ extended release tablet Take 1 tablet by mouth 2 times daily 180 tablet 1    ramipril (ALTACE) 10 MG capsule Take 1 capsule by mouth daily 90 capsule 1    Pseudoephedrine HCl (SUDAFED 12 HOUR PO) Take by mouth OTC as directed      aspirin 81 MG EC tablet Take 1 tablet by mouth daily      baclofen (LIORESAL) 20 MG tablet Take 1 tablet by

## 2025-06-03 ENCOUNTER — OFFICE VISIT (OUTPATIENT)
Dept: FAMILY MEDICINE CLINIC | Facility: CLINIC | Age: 55
End: 2025-06-03
Payer: MEDICARE

## 2025-06-03 VITALS
HEIGHT: 68 IN | OXYGEN SATURATION: 96 % | SYSTOLIC BLOOD PRESSURE: 126 MMHG | DIASTOLIC BLOOD PRESSURE: 85 MMHG | WEIGHT: 229 LBS | TEMPERATURE: 97.8 F | RESPIRATION RATE: 18 BRPM | HEART RATE: 79 BPM | BODY MASS INDEX: 34.71 KG/M2

## 2025-06-03 DIAGNOSIS — I10 PRIMARY HYPERTENSION: Primary | ICD-10-CM

## 2025-06-03 DIAGNOSIS — E78.00 HYPERCHOLESTEREMIA: ICD-10-CM

## 2025-06-03 DIAGNOSIS — N40.0 BENIGN PROSTATIC HYPERPLASIA WITHOUT LOWER URINARY TRACT SYMPTOMS: ICD-10-CM

## 2025-06-03 PROCEDURE — 3074F SYST BP LT 130 MM HG: CPT | Performed by: FAMILY MEDICINE

## 2025-06-03 PROCEDURE — G8417 CALC BMI ABV UP PARAM F/U: HCPCS | Performed by: FAMILY MEDICINE

## 2025-06-03 PROCEDURE — 3079F DIAST BP 80-89 MM HG: CPT | Performed by: FAMILY MEDICINE

## 2025-06-03 PROCEDURE — 99214 OFFICE O/P EST MOD 30 MIN: CPT | Performed by: FAMILY MEDICINE

## 2025-06-03 PROCEDURE — 3017F COLORECTAL CA SCREEN DOC REV: CPT | Performed by: FAMILY MEDICINE

## 2025-06-03 PROCEDURE — G8427 DOCREV CUR MEDS BY ELIG CLIN: HCPCS | Performed by: FAMILY MEDICINE

## 2025-06-03 PROCEDURE — 4004F PT TOBACCO SCREEN RCVD TLK: CPT | Performed by: FAMILY MEDICINE

## 2025-06-03 RX ORDER — RAMIPRIL 10 MG/1
10 CAPSULE ORAL DAILY
Qty: 90 CAPSULE | Refills: 1 | Status: SHIPPED | OUTPATIENT
Start: 2025-06-03

## 2025-06-03 RX ORDER — ATORVASTATIN CALCIUM 80 MG/1
80 TABLET, FILM COATED ORAL DAILY
Qty: 90 TABLET | Refills: 1 | Status: SHIPPED | OUTPATIENT
Start: 2025-06-03

## 2025-06-03 RX ORDER — INDAPAMIDE 1.25 MG/1
1.25 TABLET ORAL DAILY
Qty: 90 TABLET | Refills: 1 | Status: SHIPPED | OUTPATIENT
Start: 2025-06-03

## 2025-06-03 RX ORDER — POTASSIUM CHLORIDE 750 MG/1
10 TABLET, EXTENDED RELEASE ORAL 2 TIMES DAILY
Qty: 180 TABLET | Refills: 1 | Status: SHIPPED | OUTPATIENT
Start: 2025-06-03

## 2025-06-03 SDOH — ECONOMIC STABILITY: FOOD INSECURITY: WITHIN THE PAST 12 MONTHS, THE FOOD YOU BOUGHT JUST DIDN'T LAST AND YOU DIDN'T HAVE MONEY TO GET MORE.: NEVER TRUE

## 2025-06-03 SDOH — ECONOMIC STABILITY: FOOD INSECURITY: WITHIN THE PAST 12 MONTHS, YOU WORRIED THAT YOUR FOOD WOULD RUN OUT BEFORE YOU GOT MONEY TO BUY MORE.: NEVER TRUE

## 2025-06-03 ASSESSMENT — PATIENT HEALTH QUESTIONNAIRE - PHQ9
SUM OF ALL RESPONSES TO PHQ QUESTIONS 1-9: 0
1. LITTLE INTEREST OR PLEASURE IN DOING THINGS: NOT AT ALL
2. FEELING DOWN, DEPRESSED OR HOPELESS: NOT AT ALL
SUM OF ALL RESPONSES TO PHQ QUESTIONS 1-9: 0

## 2025-06-03 NOTE — PROGRESS NOTES
Subjective:  Reyes Singh Jr. is a 54 y.o. male presents today for their semi-annual htn and psa visit. They are having no side effects and are doing well.  PHQ-9 Total Score: 0 (6/3/2025  2:31 PM)      Systems review of cardiovascular and pulmonary systems reveal no complaints or pertinent positives.  Patient denies any pounding heart beats or rapid heart beat intervals, flushing, panic like attacks, headaches, dizzyness or flushing.  They have drug reistant hypertension, on 3 or more different medicaitons including one diuretic- No    How much are you exercising? Yes  Do you smoke? No  Are you taking your medicines as directed most every day? Yes  Do you follow a low sodium DASH diet or similar high blood pressure diet? No  Do you check your bp at home with an automated blood pressure device? Yes  If you don't have a home bp machine, you should purchase one at home and begin to check your pressure at home on a regular basis.  Your blood pressure goal is listed under the \"plan section\" below    No Known Allergies   reports that he has never smoked. He has been exposed to tobacco smoke. His smokeless tobacco use includes snuff.  Current Outpatient Medications   Medication Sig Dispense Refill    atorvastatin (LIPITOR) 80 MG tablet Take 1 tablet by mouth daily 90 tablet 1    indapamide (LOZOL) 1.25 MG tablet Take 1 tablet by mouth daily 90 tablet 1    potassium chloride (KLOR-CON M) 10 MEQ extended release tablet Take 1 tablet by mouth 2 times daily 180 tablet 1    ramipril (ALTACE) 10 MG capsule Take 1 capsule by mouth daily 90 capsule 1    Pseudoephedrine HCl (SUDAFED 12 HOUR PO) Take by mouth OTC as directed      aspirin 81 MG EC tablet Take 1 tablet by mouth daily      baclofen (LIORESAL) 20 MG tablet Take 1 tablet by mouth 3 times daily      DULoxetine (CYMBALTA) 60 MG extended release capsule Take 1 capsule by mouth 2 times daily      Phenylephrine HCl 10 MG TABS Take 1 tablet by mouth      tapentadol

## 2025-06-18 ENCOUNTER — TELEPHONE (OUTPATIENT)
Dept: FAMILY MEDICINE CLINIC | Facility: CLINIC | Age: 55
End: 2025-06-18

## 2025-06-18 NOTE — TELEPHONE ENCOUNTER
Patient called requesting a copy of his flu shot immunization (He had in Dec)     He states that needs this emailed to:     [email or web address]     Please advise

## 2025-06-19 ENCOUNTER — LAB (OUTPATIENT)
Dept: FAMILY MEDICINE CLINIC | Facility: CLINIC | Age: 55
End: 2025-06-19

## 2025-06-19 DIAGNOSIS — I10 PRIMARY HYPERTENSION: ICD-10-CM

## 2025-06-19 DIAGNOSIS — N40.0 BENIGN PROSTATIC HYPERPLASIA WITHOUT LOWER URINARY TRACT SYMPTOMS: ICD-10-CM

## 2025-06-19 LAB
ANION GAP SERPL CALC-SCNC: 10 MMOL/L (ref 7–16)
BUN SERPL-MCNC: 21 MG/DL (ref 6–23)
CALCIUM SERPL-MCNC: 9.7 MG/DL (ref 8.8–10.2)
CHLORIDE SERPL-SCNC: 102 MMOL/L (ref 98–107)
CO2 SERPL-SCNC: 29 MMOL/L (ref 20–29)
CREAT SERPL-MCNC: 1 MG/DL (ref 0.8–1.3)
GLUCOSE SERPL-MCNC: 99 MG/DL (ref 70–99)
POTASSIUM SERPL-SCNC: 3.9 MMOL/L (ref 3.5–5.1)
PSA SERPL-MCNC: 0.7 NG/ML (ref 0–4)
SODIUM SERPL-SCNC: 141 MMOL/L (ref 136–145)

## 2025-06-20 ENCOUNTER — RESULTS FOLLOW-UP (OUTPATIENT)
Dept: FAMILY MEDICINE CLINIC | Facility: CLINIC | Age: 55
End: 2025-06-20